# Patient Record
Sex: FEMALE | Race: WHITE | NOT HISPANIC OR LATINO
[De-identification: names, ages, dates, MRNs, and addresses within clinical notes are randomized per-mention and may not be internally consistent; named-entity substitution may affect disease eponyms.]

---

## 2020-07-08 PROBLEM — Z00.00 ENCOUNTER FOR PREVENTIVE HEALTH EXAMINATION: Status: ACTIVE | Noted: 2020-07-08

## 2020-07-09 ENCOUNTER — APPOINTMENT (OUTPATIENT)
Dept: PLASTIC SURGERY | Facility: CLINIC | Age: 50
End: 2020-07-09
Payer: COMMERCIAL

## 2020-07-09 VITALS
BODY MASS INDEX: 33.32 KG/M2 | SYSTOLIC BLOOD PRESSURE: 121 MMHG | DIASTOLIC BLOOD PRESSURE: 63 MMHG | WEIGHT: 200 LBS | HEART RATE: 99 BPM | TEMPERATURE: 98.7 F | HEIGHT: 65 IN | RESPIRATION RATE: 20 BRPM | OXYGEN SATURATION: 100 %

## 2020-07-09 DIAGNOSIS — Z86.59 PERSONAL HISTORY OF OTHER MENTAL AND BEHAVIORAL DISORDERS: ICD-10-CM

## 2020-07-09 DIAGNOSIS — Z86.69 PERSONAL HISTORY OF OTHER DISEASES OF THE NERVOUS SYSTEM AND SENSE ORGANS: ICD-10-CM

## 2020-07-09 PROCEDURE — 99205 OFFICE O/P NEW HI 60 MIN: CPT

## 2020-07-09 NOTE — HISTORY OF PRESENT ILLNESS
[FreeTextEntry1] :  Ms. DEVIN CHOPRA  is a   50 year  old female complaining of large hypertrophic breasts, causing neck, back and shoulder strain.  She also complains of headache and fatigue from constantly trying to support her spine.  Her symptoms began shortly after puberty and remain a constant source  of discomfort and social embarrassment. Her symptoms are not relieved by postural changes weight reduction or supportive measures.  She has been under the care of other practitioners for relief which is ineffectual. The patient is a DDD cup and desires reduction to C_D cup.   The risks, benefits, alternatives limitations and the permanent scars were outlined with the patient and she is prepared for a pedicled  breast reduction with anchor scar.\par

## 2020-07-09 NOTE — ASSESSMENT
[FreeTextEntry1] : Ms. DEVIN CHOPRA  is a suitable candidate for a pedicled breast reduction.  Planning inferior pedicle with anchor scar pt has an umbilical hernia that has been symptomatic and she will see general surgeon for repair asap or complete this with breast surgery

## 2020-07-09 NOTE — REVIEW OF SYSTEMS
[Feeling Tired] : feeling tired [Recent Weight Loss (___ Lbs)] : recent [unfilled] ~Ulb weight loss [Shoulder Pain] : shoulder pain [Mid Back Pain] : mid back pain [Lower Back Pain] : lower back pain [Skin Lesions] : skin lesion [Upper Back Pain] : upper back pain [Depression] : depression [Negative] : Heme/Lymph [de-identified] : migraine headaches with aura [FreeTextEntry7] : hernia  [de-identified] : fungal rashes  under breasts

## 2020-10-22 ENCOUNTER — APPOINTMENT (OUTPATIENT)
Dept: PLASTIC SURGERY | Facility: CLINIC | Age: 50
End: 2020-10-22
Payer: COMMERCIAL

## 2020-10-22 PROCEDURE — 99214 OFFICE O/P EST MOD 30 MIN: CPT

## 2020-10-22 PROCEDURE — 99072 ADDL SUPL MATRL&STAF TM PHE: CPT

## 2020-10-26 RX ORDER — TOBRAMYCIN AND DEXAMETHASONE 3; 1 MG/ML; MG/ML
0.3-0.1 SUSPENSION/ DROPS OPHTHALMIC
Qty: 5 | Refills: 0 | Status: ACTIVE | COMMUNITY
Start: 2020-10-14

## 2020-10-26 RX ORDER — OFLOXACIN 3 MG/ML
0.3 SOLUTION/ DROPS OPHTHALMIC
Qty: 30 | Refills: 0 | Status: ACTIVE | COMMUNITY
Start: 2020-10-11

## 2020-10-26 RX ORDER — MONTELUKAST 10 MG/1
10 TABLET, FILM COATED ORAL
Qty: 90 | Refills: 0 | Status: ACTIVE | COMMUNITY
Start: 2020-09-26

## 2020-10-26 RX ORDER — TOPIRAMATE 25 MG/1
25 CAPSULE, COATED PELLETS ORAL
Qty: 180 | Refills: 0 | Status: ACTIVE | COMMUNITY
Start: 2020-09-26

## 2020-10-26 NOTE — ASSESSMENT
[FreeTextEntry1] : Ms. DEVIN CHOPRA  is a suitable candidate for a pedicled breast reduction.  Planning inferior pedicle with anchor scar and umbilical hernia repair by general surgeon  750 gm reduction r side  650-700 gm l smaller side

## 2020-10-26 NOTE — HISTORY OF PRESENT ILLNESS
[FreeTextEntry1] : 2nd consult preop discussion pt is prepared for breast reduction and I anticipate removal of 750 gm r larger side and 650 gm l smaller side  The risks, benefits, alternatives, limitations and the permanent scars were outlined with the patient.\par  Ms. DEVIN CHOPRA  is a   50 year  old female complaining of large hypertrophic breasts, causing neck, back and shoulder strain.  She also complains of headache and fatigue from constantly trying to support her spine.  Her symptoms began shortly after puberty and remain a constant source  of discomfort and social embarrassment. Her symptoms are not relieved by postural changes weight reduction or supportive measures.  She has been under the care of other practitioners for relief which is ineffectual. The patient is a DDD cup and desires reduction to C_D cup.   The risks, benefits, alternatives limitations and the permanent scars were outlined with the patient and she is prepared for a pedicled  breast reduction with anchor  scar.\par

## 2020-10-26 NOTE — PHYSICAL EXAM
[NI] : Normal [de-identified] : sn n 33 cm bilateral nl sens no mass  r larger than l  gm  [de-identified] : umbilical hernia  [de-identified] : bra grooves

## 2020-11-19 ENCOUNTER — APPOINTMENT (OUTPATIENT)
Dept: PLASTIC SURGERY | Facility: CLINIC | Age: 50
End: 2020-11-19
Payer: COMMERCIAL

## 2020-11-19 PROCEDURE — 19318 BREAST REDUCTION: CPT | Mod: 50

## 2020-11-24 ENCOUNTER — APPOINTMENT (OUTPATIENT)
Dept: PLASTIC SURGERY | Facility: CLINIC | Age: 50
End: 2020-11-24

## 2020-11-24 VITALS
DIASTOLIC BLOOD PRESSURE: 69 MMHG | RESPIRATION RATE: 18 BRPM | SYSTOLIC BLOOD PRESSURE: 111 MMHG | OXYGEN SATURATION: 100 % | HEART RATE: 84 BPM | TEMPERATURE: 97.9 F

## 2020-11-24 DIAGNOSIS — Z09 ENCOUNTER FOR FOLLOW-UP EXAMINATION AFTER COMPLETED TREATMENT FOR CONDITIONS OTHER THAN MALIGNANT NEOPLASM: ICD-10-CM

## 2020-12-03 ENCOUNTER — APPOINTMENT (OUTPATIENT)
Dept: PLASTIC SURGERY | Facility: CLINIC | Age: 50
End: 2020-12-03
Payer: COMMERCIAL

## 2020-12-03 VITALS
TEMPERATURE: 98.8 F | RESPIRATION RATE: 20 BRPM | DIASTOLIC BLOOD PRESSURE: 63 MMHG | HEART RATE: 72 BPM | SYSTOLIC BLOOD PRESSURE: 129 MMHG | OXYGEN SATURATION: 99 %

## 2020-12-03 DIAGNOSIS — N62 HYPERTROPHY OF BREAST: ICD-10-CM

## 2020-12-03 PROCEDURE — 99024 POSTOP FOLLOW-UP VISIT: CPT

## 2020-12-18 ENCOUNTER — APPOINTMENT (OUTPATIENT)
Dept: PLASTIC SURGERY | Facility: CLINIC | Age: 50
End: 2020-12-18
Payer: COMMERCIAL

## 2020-12-18 VITALS
BODY MASS INDEX: 33.32 KG/M2 | HEART RATE: 88 BPM | WEIGHT: 200 LBS | OXYGEN SATURATION: 98 % | HEIGHT: 65 IN | DIASTOLIC BLOOD PRESSURE: 80 MMHG | SYSTOLIC BLOOD PRESSURE: 127 MMHG

## 2020-12-18 PROCEDURE — 99024 POSTOP FOLLOW-UP VISIT: CPT

## 2020-12-18 RX ORDER — AMOXICILLIN AND CLAVULANATE POTASSIUM 875; 125 MG/1; MG/1
875-125 TABLET, COATED ORAL
Qty: 14 | Refills: 0 | Status: ACTIVE | COMMUNITY
Start: 2020-12-18 | End: 1900-01-01

## 2020-12-18 NOTE — HISTORY OF PRESENT ILLNESS
[FreeTextEntry1] : small area r breast   at junction of areola and vertical scar with suture granuloma sred area opened  no purulence seen. The patient denies fever,chills, shortness of breath, chest pain, calf pain pt to start a week of augmentin and rto tues for check .  disc taking probiotics as well. \par

## 2020-12-18 NOTE — ASSESSMENT
[FreeTextEntry1] : suture granuloma pt to take augmentin 875 bid and probiotic DEVIN will return to the office for a post procedure visit in 4  d

## 2021-02-12 PROBLEM — N62 BREAST HYPERTROPHY: Status: ACTIVE | Noted: 2020-07-09

## 2021-02-12 NOTE — ASSESSMENT
[FreeTextEntry1] : excellent progress after bilateral breast reduction DEVIN will return to the office for a post procedure visit in 3 months The instructions were reviewed in detail with DVEIN.\par All of DEVIN 's questions were answered completely\par

## 2021-02-12 NOTE — HISTORY OF PRESENT ILLNESS
[FreeTextEntry1] : p[ is s/p bilateral inferior pedicle breast reduction The patient denies fever,chills, shortness of breath, chest pain, calf pain DEVIN  has had uncomplicated recovery from surgery and anesthesia .good shape and symmetry  sl clean and  dry sensation present bilaterally flaps healthy and no dvt

## 2023-10-29 ENCOUNTER — NON-APPOINTMENT (OUTPATIENT)
Age: 53
End: 2023-10-29

## 2023-11-11 ENCOUNTER — NON-APPOINTMENT (OUTPATIENT)
Age: 53
End: 2023-11-11